# Patient Record
Sex: FEMALE | Race: WHITE | Employment: FULL TIME | URBAN - METROPOLITAN AREA
[De-identification: names, ages, dates, MRNs, and addresses within clinical notes are randomized per-mention and may not be internally consistent; named-entity substitution may affect disease eponyms.]

---

## 2022-10-29 ENCOUNTER — APPOINTMENT (OUTPATIENT)
Dept: GENERAL RADIOLOGY | Age: 62
End: 2022-10-29
Payer: COMMERCIAL

## 2022-10-29 ENCOUNTER — HOSPITAL ENCOUNTER (EMERGENCY)
Age: 62
Discharge: HOME | End: 2022-10-29
Attending: EMERGENCY MEDICINE | Admitting: EMERGENCY MEDICINE
Payer: COMMERCIAL

## 2022-10-29 VITALS
DIASTOLIC BLOOD PRESSURE: 84 MMHG | RESPIRATION RATE: 20 BRPM | TEMPERATURE: 98.4 F | BODY MASS INDEX: 28.53 KG/M2 | HEIGHT: 63 IN | OXYGEN SATURATION: 98 % | HEART RATE: 64 BPM | WEIGHT: 161 LBS | SYSTOLIC BLOOD PRESSURE: 156 MMHG

## 2022-10-29 DIAGNOSIS — J20.9 ACUTE BRONCHITIS, UNSPECIFIED ORGANISM: Primary | ICD-10-CM

## 2022-10-29 LAB
FLUAV AG NPH QL IA: NEGATIVE
FLUBV AG NPH QL IA: NEGATIVE
SARS-COV-2 RDRP RESP QL NAA+PROBE: NOT DETECTED
SOURCE: NORMAL
SPECIMEN SOURCE: NORMAL

## 2022-10-29 PROCEDURE — 99284 EMERGENCY DEPT VISIT MOD MDM: CPT

## 2022-10-29 PROCEDURE — 71045 X-RAY EXAM CHEST 1 VIEW: CPT

## 2022-10-29 PROCEDURE — 94761 N-INVAS EAR/PLS OXIMETRY MLT: CPT

## 2022-10-29 PROCEDURE — 87635 SARS-COV-2 COVID-19 AMP PRB: CPT

## 2022-10-29 PROCEDURE — 87804 INFLUENZA ASSAY W/OPTIC: CPT

## 2022-10-29 RX ORDER — LANSOPRAZOLE 30 MG/1
30 CAPSULE, DELAYED RELEASE ORAL DAILY
COMMUNITY
End: 2022-11-25

## 2022-10-29 RX ORDER — ALBUTEROL SULFATE 90 UG/1
2 AEROSOL, METERED RESPIRATORY (INHALATION) EVERY 4 HOURS PRN
Qty: 1 EACH | Refills: 0 | Status: SHIPPED | OUTPATIENT
Start: 2022-10-29

## 2022-10-29 ASSESSMENT — ENCOUNTER SYMPTOMS
RHINORRHEA: 1
SHORTNESS OF BREATH: 0
CHEST TIGHTNESS: 0
ABDOMINAL PAIN: 0
VOMITING: 0
SORE THROAT: 1
NAUSEA: 1
DIARRHEA: 1
COUGH: 1

## 2022-10-29 ASSESSMENT — PAIN SCALES - GENERAL
PAINLEVEL_OUTOF10: 3
PAINLEVEL_OUTOF10: 4

## 2022-10-29 ASSESSMENT — PAIN - FUNCTIONAL ASSESSMENT
PAIN_FUNCTIONAL_ASSESSMENT: 0-10
PAIN_FUNCTIONAL_ASSESSMENT: 0-10

## 2022-10-29 ASSESSMENT — PAIN DESCRIPTION - LOCATION: LOCATION: RIB CAGE

## 2022-10-29 NOTE — ED PROVIDER NOTES
Emergency Department Provider Note                   PCP:                None Provider               Age: 60 y.o. Sex: female       ICD-10-CM    1. Acute bronchitis, unspecified organism  J20.9           DISPOSITION Decision To Discharge 10/29/2022 04:14:36 PM        MDM  Number of Diagnoses or Management Options  Acute bronchitis, unspecified organism  Diagnosis management comments: Patient has URI with cough and clinically no pneumonia. Chest x-ray is also negative for pneumonia. Will test for flu and offer her Tamiflu if positive. We will test for COVID and offer her PACs lipids since she is unvaccinated if positive. No tachycardia or hypoxia to suggest PE and no PE risk factors. Amount and/or Complexity of Data Reviewed  Clinical lab tests: ordered and reviewed  Tests in the radiology section of CPT®: ordered and reviewed    Risk of Complications, Morbidity, and/or Mortality  Presenting problems: low  Diagnostic procedures: low  Management options: low    Patient Progress  Patient progress: stable             Orders Placed This Encounter   Procedures    COVID-19, Rapid    Rapid influenza A/B antigens    XR CHEST PORTABLE    Continuous Pulse Oximetry        Medications - No data to display    New Prescriptions    ALBUTEROL SULFATE HFA (VENTOLIN HFA) 108 (90 BASE) MCG/ACT INHALER    Inhale 2 puffs into the lungs every 4 hours as needed for Wheezing (cough)        Miguel Lawler is a 60 y.o. female who presents to the Emergency Department with chief complaint of    Chief Complaint   Patient presents with    Shortness of Breath    Cough      69-year-old female with sore throat runny nose and congestion yesterday and this morning with cough and pain in her chest when she coughs and breathes. Low-grade fever reported. Patient concerned about going to work and being contagious. She is unvaccinated for COVID and the flu. She has a history of asthma.         Review of Systems   Constitutional: Positive for fever. Negative for chills. HENT:  Positive for congestion, rhinorrhea and sore throat. Respiratory:  Positive for cough. Negative for chest tightness and shortness of breath. Cardiovascular:  Positive for chest pain. Negative for leg swelling. Gastrointestinal:  Positive for diarrhea and nausea. Negative for abdominal pain and vomiting. Musculoskeletal:  Negative for arthralgias and myalgias. All other systems reviewed and are negative. Past Medical History:   Diagnosis Date    Asthma     Hypertension         No past surgical history on file. No family history on file. Social History     Socioeconomic History    Marital status:    Tobacco Use    Smoking status: Never    Smokeless tobacco: Never   Substance and Sexual Activity    Alcohol use: Yes     Alcohol/week: 3.0 standard drinks     Types: 3 Glasses of wine per week         Penicillins     Previous Medications    LANSOPRAZOLE (PREVACID) 30 MG DELAYED RELEASE CAPSULE    Take 30 mg by mouth daily    PROPRANOLOL (INDERAL XL) 80 MG EXTENDED RELEASE CAPSULE    Take 80 mg by mouth daily        Vitals signs and nursing note reviewed. Patient Vitals for the past 4 hrs:   Temp Pulse Resp SpO2   10/29/22 1520 -- 58 -- --   10/29/22 1418 98.4 °F (36.9 °C) 53 18 99 %          Physical Exam  Vitals and nursing note reviewed. Constitutional:       General: She is not in acute distress. Appearance: Normal appearance. She is not ill-appearing. HENT:      Head: Normocephalic and atraumatic. Nose: Nose normal.      Mouth/Throat:      Mouth: Mucous membranes are moist.   Eyes:      Conjunctiva/sclera: Conjunctivae normal.      Pupils: Pupils are equal, round, and reactive to light. Cardiovascular:      Rate and Rhythm: Normal rate and regular rhythm. Pulses: Normal pulses. Heart sounds: Normal heart sounds. Pulmonary:      Effort: Pulmonary effort is normal.      Breath sounds: Normal breath sounds. Abdominal:      General: There is no distension. Palpations: Abdomen is soft. Tenderness: There is no abdominal tenderness. There is no guarding or rebound. Musculoskeletal:         General: Normal range of motion. Right lower leg: No tenderness. No edema. Left lower leg: No tenderness. No edema. Skin:     General: Skin is warm and dry. Neurological:      Mental Status: She is alert and oriented to person, place, and time. Psychiatric:         Behavior: Behavior normal.        Procedures    Results for orders placed or performed during the hospital encounter of 10/29/22   COVID-19, Rapid    Specimen: Nasopharyngeal   Result Value Ref Range    Source Nasopharyngeal      SARS-CoV-2, Rapid Not detected NOTD     Rapid influenza A/B antigens    Specimen: Nasal Washing   Result Value Ref Range    Influenza A Ag Negative NEG      Influenza B Ag Negative NEG      Source Nasopharyngeal     XR CHEST PORTABLE    Narrative    Chest portable    CLINICAL INDICATION: Acute moderate shortness of breath, productive cough    COMPARISON: None    TECHNIQUE: single AP portable view chest at 2:30 PM upright     FINDINGS:  There is no evidence of consolidation, pneumothorax, pleural effusion  or pulmonary edema. Minimal linear atelectasis in the left base. The  mediastinal and hilar contours are normal given technique. Bone density appears  low. Patient is slightly rotated to the left. Impression    No acute airspace disease. XR CHEST PORTABLE   Final Result   No acute airspace disease. Voice dictation software was used during the making of this note. This software is not perfect and grammatical and other typographical errors may be present. This note has not been completely proofread for errors.      Evelyn Reyna MD  10/29/22 1514

## 2022-10-29 NOTE — DISCHARGE INSTRUCTIONS
Tylenol and Motrin for aches pains and fevers. Albuterol 2 puffs with spacer every 4 hours as needed for cough or wheezing. NyQuil at night for cough suppression and improved sleep. Warm fluids, chicken soup and cough drops during the day for cough. Follow-up with the doctor provided in 1 to 2 weeks if not improving, cough may persist for several weeks.

## 2022-11-16 SDOH — HEALTH STABILITY: PHYSICAL HEALTH: ON AVERAGE, HOW MANY DAYS PER WEEK DO YOU ENGAGE IN MODERATE TO STRENUOUS EXERCISE (LIKE A BRISK WALK)?: 0 DAYS

## 2022-11-16 SDOH — HEALTH STABILITY: PHYSICAL HEALTH: ON AVERAGE, HOW MANY MINUTES DO YOU ENGAGE IN EXERCISE AT THIS LEVEL?: 0 MIN

## 2022-11-17 ENCOUNTER — OFFICE VISIT (OUTPATIENT)
Dept: PRIMARY CARE CLINIC | Facility: CLINIC | Age: 62
End: 2022-11-17
Payer: COMMERCIAL

## 2022-11-17 VITALS
HEIGHT: 63 IN | SYSTOLIC BLOOD PRESSURE: 191 MMHG | RESPIRATION RATE: 15 BRPM | OXYGEN SATURATION: 95 % | HEART RATE: 62 BPM | TEMPERATURE: 97.3 F | BODY MASS INDEX: 29.06 KG/M2 | DIASTOLIC BLOOD PRESSURE: 85 MMHG | WEIGHT: 164 LBS

## 2022-11-17 DIAGNOSIS — Z87.891 EX-SMOKER: ICD-10-CM

## 2022-11-17 DIAGNOSIS — I10 UNCONTROLLED HYPERTENSION: ICD-10-CM

## 2022-11-17 DIAGNOSIS — Z12.31 ENCOUNTER FOR SCREENING MAMMOGRAM FOR MALIGNANT NEOPLASM OF BREAST: ICD-10-CM

## 2022-11-17 DIAGNOSIS — E11.69 TYPE 2 DIABETES MELLITUS WITH OTHER SPECIFIED COMPLICATION, WITHOUT LONG-TERM CURRENT USE OF INSULIN (HCC): ICD-10-CM

## 2022-11-17 DIAGNOSIS — Z79.899 MEDICATION MANAGEMENT: ICD-10-CM

## 2022-11-17 DIAGNOSIS — Z91.89 MULTIPLE RISK FACTORS FOR CORONARY ARTERY DISEASE: ICD-10-CM

## 2022-11-17 DIAGNOSIS — Z84.2: ICD-10-CM

## 2022-11-17 DIAGNOSIS — R06.02 SOB (SHORTNESS OF BREATH): ICD-10-CM

## 2022-11-17 DIAGNOSIS — Z85.858 HISTORY OF PARATHYROID CANCER: ICD-10-CM

## 2022-11-17 DIAGNOSIS — Z12.11 COLON CANCER SCREENING: ICD-10-CM

## 2022-11-17 DIAGNOSIS — J44.1 COPD EXACERBATION (HCC): Primary | ICD-10-CM

## 2022-11-17 PROBLEM — R06.89 BREATHING DIFFICULT: Status: ACTIVE | Noted: 2022-10-27

## 2022-11-17 LAB
ALBUMIN SERPL-MCNC: 3.7 G/DL (ref 3.2–4.6)
ALBUMIN/GLOB SERPL: 1.1 (ref 0.4–1.6)
ALP SERPL-CCNC: 64 U/L (ref 50–136)
ALT SERPL-CCNC: 51 U/L (ref 12–65)
ANION GAP SERPL CALC-SCNC: 4 MMOL/L (ref 2–11)
AST SERPL-CCNC: 25 U/L (ref 15–37)
BASOPHILS # BLD: 0.1 K/UL (ref 0–0.2)
BASOPHILS NFR BLD: 1 % (ref 0–2)
BILIRUB SERPL-MCNC: 0.2 MG/DL (ref 0.2–1.1)
BILIRUBIN, URINE, POC: NEGATIVE
BLOOD URINE, POC: NEGATIVE
BUN SERPL-MCNC: 8 MG/DL (ref 8–23)
CALCIUM SERPL-MCNC: 7.7 MG/DL (ref 8.3–10.4)
CHLORIDE SERPL-SCNC: 105 MMOL/L (ref 101–110)
CHOLEST SERPL-MCNC: 198 MG/DL
CO2 SERPL-SCNC: 32 MMOL/L (ref 21–32)
CREAT SERPL-MCNC: 0.7 MG/DL (ref 0.6–1)
DIFFERENTIAL METHOD BLD: ABNORMAL
EOSINOPHIL # BLD: 0.8 K/UL (ref 0–0.8)
EOSINOPHIL NFR BLD: 8 % (ref 0.5–7.8)
ERYTHROCYTE [DISTWIDTH] IN BLOOD BY AUTOMATED COUNT: 12.8 % (ref 11.9–14.6)
GLOBULIN SER CALC-MCNC: 3.3 G/DL (ref 2.8–4.5)
GLUCOSE SERPL-MCNC: 95 MG/DL (ref 65–100)
GLUCOSE URINE, POC: NEGATIVE
HCT VFR BLD AUTO: 44.9 % (ref 35.8–46.3)
HDLC SERPL-MCNC: 58 MG/DL (ref 40–60)
HDLC SERPL: 3.4
HGB BLD-MCNC: 15 G/DL (ref 11.7–15.4)
IMM GRANULOCYTES # BLD AUTO: 0.1 K/UL (ref 0–0.5)
IMM GRANULOCYTES NFR BLD AUTO: 1 % (ref 0–5)
KETONES, URINE, POC: NEGATIVE
LDLC SERPL CALC-MCNC: 98.2 MG/DL
LEUKOCYTE ESTERASE, URINE, POC: NORMAL
LYMPHOCYTES # BLD: 3.3 K/UL (ref 0.5–4.6)
LYMPHOCYTES NFR BLD: 30 % (ref 13–44)
MCH RBC QN AUTO: 31.1 PG (ref 26.1–32.9)
MCHC RBC AUTO-ENTMCNC: 33.4 G/DL (ref 31.4–35)
MCV RBC AUTO: 93.2 FL (ref 82–102)
MONOCYTES # BLD: 1.2 K/UL (ref 0.1–1.3)
MONOCYTES NFR BLD: 11 % (ref 4–12)
NEUTS SEG # BLD: 5.6 K/UL (ref 1.7–8.2)
NEUTS SEG NFR BLD: 49 % (ref 43–78)
NITRITE, URINE, POC: NEGATIVE
NRBC # BLD: 0 K/UL (ref 0–0.2)
PH, URINE, POC: 7 (ref 4.6–8)
PLATELET # BLD AUTO: 285 K/UL (ref 150–450)
PMV BLD AUTO: 9.7 FL (ref 9.4–12.3)
POTASSIUM SERPL-SCNC: 4.5 MMOL/L (ref 3.5–5.1)
PROT SERPL-MCNC: 7 G/DL (ref 6.3–8.2)
PROTEIN,URINE, POC: NEGATIVE
RBC # BLD AUTO: 4.82 M/UL (ref 4.05–5.2)
SODIUM SERPL-SCNC: 141 MMOL/L (ref 133–143)
SPECIFIC GRAVITY, URINE, POC: 1.01 (ref 1–1.03)
TRIGL SERPL-MCNC: 209 MG/DL (ref 35–150)
TSH, 3RD GENERATION: 1.19 UIU/ML (ref 0.36–3.74)
URINALYSIS CLARITY, POC: CLEAR
URINALYSIS COLOR, POC: YELLOW
UROBILINOGEN, POC: NORMAL
VLDLC SERPL CALC-MCNC: 41.8 MG/DL (ref 6–23)
WBC # BLD AUTO: 11.1 K/UL (ref 4.3–11.1)

## 2022-11-17 PROCEDURE — 81003 URINALYSIS AUTO W/O SCOPE: CPT | Performed by: FAMILY MEDICINE

## 2022-11-17 PROCEDURE — 99204 OFFICE O/P NEW MOD 45 MIN: CPT | Performed by: FAMILY MEDICINE

## 2022-11-17 PROCEDURE — 94010 BREATHING CAPACITY TEST: CPT | Performed by: FAMILY MEDICINE

## 2022-11-17 PROCEDURE — 93000 ELECTROCARDIOGRAM COMPLETE: CPT | Performed by: FAMILY MEDICINE

## 2022-11-17 PROCEDURE — 3079F DIAST BP 80-89 MM HG: CPT | Performed by: FAMILY MEDICINE

## 2022-11-17 PROCEDURE — 3077F SYST BP >= 140 MM HG: CPT | Performed by: FAMILY MEDICINE

## 2022-11-17 RX ORDER — LOSARTAN POTASSIUM 50 MG/1
50 TABLET ORAL DAILY
Qty: 90 TABLET | Refills: 5 | Status: SHIPPED | OUTPATIENT
Start: 2022-11-17

## 2022-11-17 RX ORDER — MULTIVIT-MIN/IRON FUM/FOLIC AC 7.5 MG-4
TABLET ORAL
Qty: 100 TABLET | Refills: 3 | Status: SHIPPED | OUTPATIENT
Start: 2022-11-17 | End: 2022-11-25

## 2022-11-17 RX ORDER — FLUTICASONE PROPIONATE AND SALMETEROL 100; 50 UG/1; UG/1
1 POWDER RESPIRATORY (INHALATION)
COMMUNITY
End: 2022-11-17 | Stop reason: ALTCHOICE

## 2022-11-17 RX ORDER — METOPROLOL SUCCINATE 50 MG/1
50 TABLET, EXTENDED RELEASE ORAL DAILY
Qty: 90 TABLET | Refills: 5 | Status: SHIPPED | OUTPATIENT
Start: 2022-11-17

## 2022-11-17 RX ORDER — FLUTICASONE PROPIONATE AND SALMETEROL 500; 50 UG/1; UG/1
1 POWDER RESPIRATORY (INHALATION) EVERY 12 HOURS
Qty: 60 EACH | Refills: 3 | Status: SHIPPED | OUTPATIENT
Start: 2022-11-17

## 2022-11-17 RX ORDER — AZITHROMYCIN 250 MG/1
250 TABLET, FILM COATED ORAL SEE ADMIN INSTRUCTIONS
Qty: 6 TABLET | Refills: 0 | Status: SHIPPED | OUTPATIENT
Start: 2022-11-17 | End: 2022-11-22

## 2022-11-17 RX ORDER — CLOTRIMAZOLE AND BETAMETHASONE DIPROPIONATE 10; .64 MG/G; MG/G
CREAM TOPICAL DAILY
COMMUNITY

## 2022-11-17 RX ORDER — ALBUTEROL SULFATE 2.5 MG/3ML
SOLUTION RESPIRATORY (INHALATION)
COMMUNITY
Start: 2022-11-09 | End: 2022-11-17 | Stop reason: ALTCHOICE

## 2022-11-17 RX ORDER — FLUTICASONE PROPIONATE 50 MCG
SPRAY, SUSPENSION (ML) NASAL
COMMUNITY

## 2022-11-17 RX ORDER — EPINEPHRINE 0.3 MG/.3ML
INJECTION SUBCUTANEOUS
COMMUNITY
End: 2022-11-17 | Stop reason: ALTCHOICE

## 2022-11-17 ASSESSMENT — ENCOUNTER SYMPTOMS
EYE REDNESS: 0
BLOOD IN STOOL: 0
ABDOMINAL PAIN: 0
CHOKING: 0
BACK PAIN: 0
COUGH: 1
RHINORRHEA: 0
TROUBLE SWALLOWING: 0
SINUS PRESSURE: 0
SHORTNESS OF BREATH: 1
COLOR CHANGE: 0
VOICE CHANGE: 0
ABDOMINAL DISTENTION: 0
VOMITING: 0
SINUS PAIN: 0
CHEST TIGHTNESS: 0
PHOTOPHOBIA: 0
CONSTIPATION: 0
NAUSEA: 0
EYE PAIN: 0
SORE THROAT: 0
WHEEZING: 1
DIARRHEA: 0
EYE DISCHARGE: 0

## 2022-11-17 ASSESSMENT — PATIENT HEALTH QUESTIONNAIRE - PHQ9
SUM OF ALL RESPONSES TO PHQ9 QUESTIONS 1 & 2: 0
2. FEELING DOWN, DEPRESSED OR HOPELESS: 0
1. LITTLE INTEREST OR PLEASURE IN DOING THINGS: 0
SUM OF ALL RESPONSES TO PHQ QUESTIONS 1-9: 0

## 2022-11-17 NOTE — PROGRESS NOTES
For follow-up of her numerous medical problems. She moved from [place]. Previously lost her insurance due for preventative care exam colon cancer screening mammogram.  Recent hospital visit for shortness of breath cough or phlegm. Flu test COVID test negative. She has more than 30-year history of 1 to 2 pack smoking. It seems has COPD exacerbation she is still wheezing and has cough slight phlegm chest x-ray negative. We will check pulmonary function test EKG. Uncontrolled hypertension blood pressure in the office 191 and/85. She has been on propanolol for a long time for hypertension. We will change to metoprolol due to asthma COPD and add losartan. Questionable history diabetes check lab test.  Discussed preventative care exam risk factor management for coronary artery disease. She had surgery for carpal tunnel syndrome some sort of nerve transplant. She also had parathyroid resection for parathyroid cancer many years ago and has been disease-free. History acid reflux. She had a hysterectomy and bilateral salpingo-oophorectomy    Review of Systems   Constitutional:  Negative for activity change, appetite change, chills, diaphoresis, fatigue, fever and unexpected weight change. HENT:  Negative for congestion, ear pain, hearing loss, nosebleeds, rhinorrhea, sinus pressure, sinus pain, sore throat, trouble swallowing and voice change. Eyes:  Negative for photophobia, pain, discharge, redness and visual disturbance. Respiratory:  Positive for cough, shortness of breath and wheezing. Negative for choking and chest tightness. Cardiovascular:  Negative for chest pain, palpitations and leg swelling. Hypertension uncontrolled   Gastrointestinal:  Negative for abdominal distention, abdominal pain, blood in stool, constipation, diarrhea, nausea and vomiting. Endocrine: Negative for cold intolerance, heat intolerance, polydipsia, polyphagia and polyuria.         History of diabetes this is questionable she also thinks that she had episodes of hypoglycemia not taking any medication. Last blood sugar was 102   Genitourinary:  Negative for difficulty urinating, dysuria, enuresis, frequency, genital sores, hematuria, menstrual problem, urgency and vaginal discharge. History hysterectomy and bilateral salpingo-oophorectomy   Musculoskeletal:  Negative for arthralgias, back pain, gait problem, joint swelling, myalgias and neck pain. History carpal tunnel syndrome hand surgery   Skin:  Negative for color change and rash. Allergic/Immunologic: Negative for environmental allergies and food allergies. Neurological:  Negative for dizziness, tremors, seizures, syncope, speech difficulty, weakness, numbness and headaches. Hematological:  Negative for adenopathy. Does not bruise/bleed easily. Psychiatric/Behavioral:  Negative for behavioral problems, confusion, decreased concentration, hallucinations, self-injury, sleep disturbance and suicidal ideas. The patient is not nervous/anxious. Physical Exam  Vitals and nursing note reviewed. Exam conducted with a chaperone present. Constitutional:       General: She is not in acute distress. Appearance: Normal appearance. She is obese. She is not ill-appearing or toxic-appearing. HENT:      Head: Normocephalic and atraumatic. Right Ear: External ear normal.      Left Ear: External ear normal.      Nose: Nose normal. No congestion or rhinorrhea. Mouth/Throat:      Mouth: Mucous membranes are moist.      Pharynx: No oropharyngeal exudate. Eyes:      General: No scleral icterus. Right eye: No discharge. Left eye: No discharge. Extraocular Movements: Extraocular movements intact. Conjunctiva/sclera: Conjunctivae normal.      Pupils: Pupils are equal, round, and reactive to light. Cardiovascular:      Rate and Rhythm: Normal rate and regular rhythm. Pulses: Normal pulses.       Heart sounds: Normal heart sounds. No murmur heard. No gallop. Comments: Markedly elevated blood pressure  Pulmonary:      Effort: Pulmonary effort is normal. No respiratory distress. Breath sounds: No stridor. Wheezing and rhonchi present. No rales. Comments: Mild wheezing occasional rhonchi  Chest:      Chest wall: No tenderness. Abdominal:      General: Abdomen is flat. There is no distension. Palpations: Abdomen is soft. There is no mass. Tenderness: There is no abdominal tenderness. There is no right CVA tenderness, guarding or rebound. Hernia: No hernia is present. Genitourinary:     Vagina: No vaginal discharge. Musculoskeletal:         General: No swelling, tenderness, deformity or signs of injury. Normal range of motion. Cervical back: Normal range of motion and neck supple. No rigidity. Right lower leg: No edema. Left lower leg: No edema. Lymphadenopathy:      Cervical: No cervical adenopathy. Skin:     General: Skin is warm. Capillary Refill: Capillary refill takes less than 2 seconds. Coloration: Skin is not jaundiced or pale. Findings: No bruising, erythema, lesion or rash. Neurological:      General: No focal deficit present. Mental Status: She is alert and oriented to person, place, and time. Mental status is at baseline. Cranial Nerves: No cranial nerve deficit. Motor: No weakness. Coordination: Coordination normal.      Gait: Gait normal.   Psychiatric:         Mood and Affect: Mood normal.         Behavior: Behavior normal.        1. COPD exacerbation (HCC)  Here for follow-up after hospital visit for shortness of breath cough with productive phlegm symptoms are not resolved. She has more than 30 years history of back smoking started at the age of 60. Seems she has COPD. We will check baseline pulmonary function test EKG. Medication discussed start Combivent Advair antibiotics.   Recent chest x-ray negative Hospital records reviewed  - azithromycin (ZITHROMAX) 250 MG tablet; Take 1 tablet by mouth See Admin Instructions for 5 days 500mg on day 1 followed by 250mg on days 2 - 5  Dispense: 6 tablet; Refill: 0  - 08245 - NM BREATHING CAPACITY TEST    2. Uncontrolled hypertension  Uncontrolled hypertension multiple risk factors for coronary artery disease. Change propanolol to metoprolol and losartan consider adding statins recheck after lab test  - Comprehensive Metabolic Panel; Future  - Lipid Panel; Future  - AMB POC URINALYSIS DIP STICK AUTO W/O MICRO  - 20302 - NM ELECTROCARDIOGRAM, COMPLETE  - metoprolol succinate (TOPROL XL) 50 MG extended release tablet; Take 1 tablet by mouth daily  Dispense: 90 tablet; Refill: 5  - losartan (COZAAR) 50 MG tablet; Take 1 tablet by mouth daily  Dispense: 90 tablet; Refill: 5    3. SOB (shortness of breath)  Shortness of breath related to possible COPD  - CBC with Auto Differential; Future  - 99754 - NM ELECTROCARDIOGRAM, COMPLETE  - 12579 - NM BREATHING CAPACITY TEST    4. Medication management    - CBC with Auto Differential; Future  - Comprehensive Metabolic Panel; Future  - TSH; Future    5. Type 2 diabetes mellitus with other specified complication, without long-term current use of insulin (HCC)  Questionable history diabetes BMI 29 recheck after lab test  - Hemoglobin A1C; Future    6. Colon cancer screening  Preventative care discussed including colon cancer screening mammogram.  She had a hysterectomy does not need Pap smear. Will benefit from flu shot COVID vaccination Pneumovax. Has got insurance recently  - AMB POC BLOOD OCCULT QUAL FECAL HEMGLBN 1-3    7. Encounter for screening mammogram for malignant neoplasm of breast    - SARI DIGITAL SCREEN W OR WO CAD BILATERAL; Future    8.  Ion Lowery MD

## 2022-11-18 LAB
EST. AVERAGE GLUCOSE BLD GHB EST-MCNC: 126 MG/DL
HBA1C MFR BLD: 6 % (ref 4.8–5.6)

## 2022-11-21 DIAGNOSIS — J44.1 COPD EXACERBATION (HCC): Primary | ICD-10-CM

## 2022-11-25 ENCOUNTER — OFFICE VISIT (OUTPATIENT)
Dept: PULMONOLOGY | Age: 62
End: 2022-11-25
Payer: COMMERCIAL

## 2022-11-25 VITALS
BODY MASS INDEX: 29.06 KG/M2 | HEART RATE: 71 BPM | HEIGHT: 63 IN | TEMPERATURE: 98.1 F | OXYGEN SATURATION: 98 % | SYSTOLIC BLOOD PRESSURE: 174 MMHG | DIASTOLIC BLOOD PRESSURE: 77 MMHG | WEIGHT: 164 LBS

## 2022-11-25 DIAGNOSIS — K21.9 GASTROESOPHAGEAL REFLUX DISEASE, UNSPECIFIED WHETHER ESOPHAGITIS PRESENT: ICD-10-CM

## 2022-11-25 DIAGNOSIS — J45.51 SEVERE PERSISTENT ASTHMA WITH ACUTE EXACERBATION: Primary | ICD-10-CM

## 2022-11-25 DIAGNOSIS — R06.2 WHEEZING: ICD-10-CM

## 2022-11-25 DIAGNOSIS — R05.3 CHRONIC COUGH: ICD-10-CM

## 2022-11-25 DIAGNOSIS — R06.02 SHORTNESS OF BREATH: ICD-10-CM

## 2022-11-25 DIAGNOSIS — R09.82 POSTNASAL DRIP: ICD-10-CM

## 2022-11-25 LAB
EXPIRATORY TIME: NORMAL
FEF 25-75% %PRED-PRE: NORMAL
FEF 25-75% PRED: NORMAL
FEF 25-75%-PRE: NORMAL
FEV1 %PRED-PRE: 80 %
FEV1 PRED: NORMAL
FEV1/FVC %PRED-PRE: NORMAL
FEV1/FVC PRED: NORMAL
FEV1/FVC: 71 %
FEV1: 1.91 L
FVC %PRED-PRE: 87 %
FVC PRED: NORMAL
FVC: 2.71 L
PEF %PRED-PRE: NORMAL
PEF PRED: NORMAL
PEF-PRE: NORMAL

## 2022-11-25 PROCEDURE — 3077F SYST BP >= 140 MM HG: CPT | Performed by: INTERNAL MEDICINE

## 2022-11-25 PROCEDURE — 94010 BREATHING CAPACITY TEST: CPT | Performed by: INTERNAL MEDICINE

## 2022-11-25 PROCEDURE — 99205 OFFICE O/P NEW HI 60 MIN: CPT | Performed by: INTERNAL MEDICINE

## 2022-11-25 PROCEDURE — 3078F DIAST BP <80 MM HG: CPT | Performed by: INTERNAL MEDICINE

## 2022-11-25 RX ORDER — MONTELUKAST SODIUM 10 MG/1
10 TABLET ORAL DAILY
Qty: 90 TABLET | Refills: 4 | Status: SHIPPED | OUTPATIENT
Start: 2022-11-25 | End: 2023-02-23

## 2022-11-25 RX ORDER — LANSOPRAZOLE 15 MG/1
30 CAPSULE, DELAYED RELEASE ORAL NIGHTLY
Qty: 90 CAPSULE | Refills: 4 | Status: SHIPPED | OUTPATIENT
Start: 2022-11-25

## 2022-11-25 RX ORDER — LANSOPRAZOLE 15 MG/1
15 CAPSULE, DELAYED RELEASE ORAL NIGHTLY
Qty: 90 CAPSULE | Refills: 4 | Status: SHIPPED | OUTPATIENT
Start: 2022-11-25 | End: 2022-11-25

## 2022-11-25 RX ORDER — ALBUTEROL SULFATE 90 UG/1
2 AEROSOL, METERED RESPIRATORY (INHALATION) EVERY 6 HOURS PRN
Qty: 3 EACH | Refills: 4 | Status: SHIPPED | OUTPATIENT
Start: 2022-11-25

## 2022-11-25 RX ORDER — EPINEPHRINE 0.1 MG/ML
1 INJECTION, SOLUTION INTRAVENOUS ONCE
COMMUNITY

## 2022-11-25 RX ORDER — FLUTICASONE PROPIONATE AND SALMETEROL 500; 50 UG/1; UG/1
1 POWDER RESPIRATORY (INHALATION) EVERY 12 HOURS
Qty: 3 EACH | Refills: 4 | Status: SHIPPED | OUTPATIENT
Start: 2022-11-25 | End: 2023-02-23

## 2022-11-25 ASSESSMENT — PULMONARY FUNCTION TESTS
FVC_PERCENT_PREDICTED_PRE: 87
FEV1_PERCENT_PREDICTED_PRE: 80
FEV1: 1.91
FVC: 2.71
FEV1/FVC: 71

## 2022-11-25 NOTE — PROGRESS NOTES
[address] [facility]. [address], [address] [facility]  [phone]    Patient Name:  Kye Lawrence  YOB: 1960      Office Visit 11/25/2022    CHIEF COMPLAINT:    Chief Complaint   Patient presents with    Asthma       HISTORY OF PRESENT ILLNESS:    Is a very pleasant 69-year-old white female with history of severe childhood asthma that grew out of it as an adult presents for evaluation of recurrent episodes of shortness of breath, wheezing, coughing. The last 1 being 3 weeks ago where she presented to urgent care was given systemic steroids Advair as well as albuterol, it was recommended that she go to the hospital but she refused and actually improved with treatment and is now on her last doses of prednisone. She denies ever being admitted to the hospital or ICU for asthma exacerbation, she has environmental allergies and has been seeing allergist in the past, ragweed, hay, grass amongst others are allergens for her. She has postnasal drip as well as reflux disease, she takes lansoprazole for her reflux disease but nothing for her postnasal drip. She denies exposure to tuberculosis or asbestos, recently acquired a pet cat for the past 8 weeks with no problems with worsening symptoms according to her. 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 She is currently on Advair discus 100/50, intermittent albuterol therapy and at times nebulized albuterol therapy. She tells me that she uses albuterol at least 2-3 times a day constantly. She also has nocturnal symptoms and wakes up at night with shortness of breath. He is a former smoker having quit in 2001 and accumulated about a 40-pack-year history.       Past Medical History:   Diagnosis Date Asthma     Hypertension          Patient Active Problem List   Diagnosis    Uncontrolled hypertension    SOB (shortness of breath)    Diabetes mellitus (HCC)    FH: total abdominal hysterectomy and bilateral salpingo-oophorectomy    Ex-smoker    COPD exacerbation (HCC)    Medication management    History of parathyroid cancer           Past Surgical History:   Procedure Laterality Date    CARPAL TUNNEL RELEASE      PARATHYROID GLAND SURGERY           Social History     Socioeconomic History    Marital status:      Spouse name: Not on file    Number of children: Not on file    Years of education: Not on file    Highest education level: Not on file   Occupational History    Not on file   Tobacco Use    Smoking status: Former     Packs/day: 2.00     Years: 38.00     Pack years: 76.00     Types: Cigarettes     Quit date:      Years since quittin.9    Smokeless tobacco: Never   Substance and Sexual Activity    Alcohol use: Not Currently     Alcohol/week: 3.0 standard drinks     Types: 3 Glasses of wine per week    Drug use: Not on file    Sexual activity: Not on file   Other Topics Concern    Not on file   Social History Narrative    Not on file     Social Determinants of Health     Financial Resource Strain: Not on file   Food Insecurity: Not on file   Transportation Needs: Not on file   Physical Activity: Inactive    Days of Exercise per Week: 0 days    Minutes of Exercise per Session: 0 min   Stress: Not on file   Social Connections: Not on file   Intimate Partner Violence: Not At Risk    Fear of Current or Ex-Partner: No    Emotionally Abused: No    Physically Abused: No    Sexually Abused: No   Housing Stability: Not on file         Family History   Problem Relation Age of Onset    Hypertension Mother     Diabetes Father          Allergies   Allergen Reactions    Bee Venom Anaphylaxis    Penicillins Hives         Current Outpatient Medications   Medication Sig    EPINEPHrine 1 MG/10ML SOSY IV injection (ACLS) Infuse 1 mg intravenously once EPINEPHRINE 0.3MG/0.3ML INJ MATIAS    fluticasone-salmeterol (ADVAIR DISKUS) 500-50 MCG/ACT AEPB diskus inhaler Inhale 1 puff into the lungs in the morning and 1 puff in the evening. albuterol sulfate HFA (PROAIR HFA) 108 (90 Base) MCG/ACT inhaler Inhale 2 puffs into the lungs every 6 hours as needed for Wheezing    montelukast (SINGULAIR) 10 MG tablet Take 1 tablet by mouth daily    lansoprazole (PREVACID) 15 MG delayed release capsule Take 2 capsules by mouth nightly    fluticasone (FLONASE) 50 MCG/ACT nasal spray by Nasal route    clotrimazole-betamethasone (LOTRISONE) 1-0.05 % cream Apply topically daily    albuterol-ipratropium (COMBIVENT RESPIMAT)  MCG/ACT AERS inhaler Inhale 1 puff into the lungs in the morning and 1 puff at noon and 1 puff in the evening and 1 puff before bedtime. fluticasone-salmeterol (ADVAIR) 500-50 MCG/ACT AEPB diskus inhaler Inhale 1 puff into the lungs in the morning and 1 puff in the evening. metoprolol succinate (TOPROL XL) 50 MG extended release tablet Take 1 tablet by mouth daily    losartan (COZAAR) 50 MG tablet Take 1 tablet by mouth daily    albuterol sulfate HFA (VENTOLIN HFA) 108 (90 Base) MCG/ACT inhaler Inhale 2 puffs into the lungs every 4 hours as needed for Wheezing (cough)    Multiple Vitamins-Minerals (MULTIVITAMIN WITH MINERALS) tablet Once daily OTC     No current facility-administered medications for this visit. Review of Systems          PHYSICAL EXAM:    Vitals:    11/25/22 0943   BP: (!) 174/77   Pulse: 71   Temp: 98.1 °F (36.7 °C)   SpO2: 98%        GENERAL APPEARANCE:   The patient is normal weight and in no respiratory distress. HEENT:   PERRL. Conjunctivae unremarkable. Nasal mucosa is without epistaxis, exudate, or polyps. Gums and dentition are unremarkable. There is no oropharyngeal narrowing. TMs are clear.    NECK/LYMPHATIC:   Symmetrical with no elevation of jugular venous pulsation. Trachea midline. No thyroid enlargement. No cervical adenopathy. LUNGS:   Normal respiratory effort with symmetrical lung expansion. Breath sounds bilateral end expiratory wheezing. HEART:   There is a regular rate and rhythm. No murmur, rub, or gallop. There is no edema in the lower extremities. ABDOMEN:   Soft and non-tender. No hepatosplenomegaly. Bowel sounds are normal.     SKIN:   There are no rashes, cyanosis, jaundice, or ecchymosis present. EXTREMITIES:   The extremities are unremarkable without clubbing, cyanosis, joint inflammation, degenerative, or ischemic change. MUSCULOSKELETAL:   There is no abnormal tone, muscle atrophy, or abnormal movement present. NEURO:   The patient is alert and oriented to person, place, and time. Memory appears intact and mood is normal.  No gross sensorimotor deficits are present. DIAGNOSTIC TESTS:      Spirometry:    11/25/2022               ASSESSMENT:  (Medical Decision Making)       Patient Active Problem List   Diagnosis    Uncontrolled hypertension    SOB (shortness of breath)    Diabetes mellitus (HCC)    FH: total abdominal hysterectomy and bilateral salpingo-oophorectomy    Ex-smoker    COPD exacerbation ([facility] [address]    Medication management    History of parathyroid cancer           PLAN:  Encounter Diagnoses   Name Primary? Severe persistent asthma with acute exacerbation Yes    Shortness of breath     Chronic cough     Wheezing     Postnasal drip     Gastroesophageal reflux disease, unspecified whether esophagitis present    The patient has clinical signs and symptoms and history consistent with uncontrolled severe persistent asthma. Her trigger most recently was likely the upper respiratory tract infection being it from RSV or not is irrelevant.   I went over the pathophysiology of asthma the treatment of asthma the principles of treatment of asthma the signs and symptoms of asthma and the signs and symptoms of exacerbation of Encounter   Procedures    Spirometry Without Bronchodilator       Orders Placed This Encounter   Medications    fluticasone-salmeterol (ADVAIR DISKUS) 500-50 MCG/ACT AEPB diskus inhaler     Sig: Inhale 1 puff into the lungs in the morning and 1 puff in the evening. Dispense:  3 each     Refill:  4    albuterol sulfate HFA (PROAIR HFA) 108 (90 Base) MCG/ACT inhaler     Sig: Inhale 2 puffs into the lungs every 6 hours as needed for Wheezing     Dispense:  3 each     Refill:  4    montelukast (SINGULAIR) 10 MG tablet     Sig: Take 1 tablet by mouth daily     Dispense:  90 tablet     Refill:  4    DISCONTD: lansoprazole (PREVACID) 15 MG delayed release capsule     Sig: Take 1 capsule by mouth nightly     Dispense:  90 capsule     Refill:  4    lansoprazole (PREVACID) 15 MG delayed release capsule     Sig: Take 2 capsules by mouth nightly     Dispense:  90 capsule     Refill:  Carlos Samuel MD    Dictated using voice recognition software.  Proofread, but unrecognized voice recognition errors may exist.

## 2022-12-07 NOTE — PROGRESS NOTES
[facility] CARDIOLOGY History & Physical                 Reason for Visit: ANDREWS    Subjective:     Patient is a 60 y.o. female with a PMH of hypertension and asthma who presents as a referral for ANDRESW. The patient follows with pulmonology for \"severe persistent asthma\". The patient denies angina. She does report ANDREWS walking up stairs. She denies lower extremity edema and orthopnea. She reports intolerances to metoprolol and losartan. Past Medical History:   Diagnosis Date    Asthma     Hypertension       Past Surgical History:   Procedure Laterality Date    CARPAL TUNNEL RELEASE      PARATHYROID GLAND SURGERY        Family History   Problem Relation Age of Onset    Hypertension Mother     Diabetes Father       Social History     Tobacco Use    Smoking status: Former     Packs/day: 2.00     Years: 38.00     Pack years: 76.00     Types: Cigarettes     Quit date:      Years since quittin.9    Smokeless tobacco: Never   Substance Use Topics    Alcohol use: Not Currently     Alcohol/week: 3.0 standard drinks     Types: 3 Glasses of wine per week      Allergies   Allergen Reactions    Bee Venom Anaphylaxis    Penicillins Hives         ROS:  No obvious pertinent positives on review of systems except for what was outlined above.        Objective:       /86   Pulse 62   Ht 5' 3\" (1.6 m)   Wt 164 lb 12.8 oz (74.8 kg)   BMI 29.19 kg/m²     BP Readings from Last 3 Encounters:   22 138/86   22 (!) 174/77   22 (!) 191/85       Wt Readings from Last 3 Encounters:   22 164 lb 12.8 oz (74.8 kg)   22 164 lb (74.4 kg)   22 164 lb (74.4 kg)       General/Constitutional:   Alert and oriented x 3, no acute distress  HEENT:   normocephalic, atraumatic, moist mucous membranes  Neck:   No JVD or carotid bruits bilaterally  Cardiovascular:   regular rate and rhythm, no rub/gallop appreciated  Pulmonary:   clear to auscultation bilaterally, no respiratory distress  Abdomen:   soft, non-tender, non-distended  Ext:   No sig LE edema bilaterally  Skin:  warm and dry, no obvious rashes seen  Neuro:   no obvious sensory or motor deficits  Psychiatric:   normal mood and affect    ECG:   Sinus rhythm  LGL pattern  Heart rate 62 bpm    Data Review:   Lab Results   Component Value Date    CHOL 198 11/17/2022     Lab Results   Component Value Date    TRIG 209 (H) 11/17/2022     Lab Results   Component Value Date    HDL 58 11/17/2022     Lab Results   Component Value Date    LDLCALC 98.2 11/17/2022     Lab Results   Component Value Date    LABVLDL 41.8 (H) 11/17/2022     Lab Results   Component Value Date    CHOLHDLRATIO 3.4 11/17/2022        Lab Results   Component Value Date/Time     11/17/2022 11:32 AM    K 4.5 11/17/2022 11:32 AM     11/17/2022 11:32 AM    CO2 32 11/17/2022 11:32 AM    BUN 8 11/17/2022 11:32 AM    CREATININE 0.70 11/17/2022 11:32 AM    GLUCOSE 95 11/17/2022 11:32 AM    CALCIUM 7.7 11/17/2022 11:32 AM         Lab Results   Component Value Date    ALT 51 11/17/2022    AST 25 11/17/2022        Assessment/Plan:   1. Hypertension, unspecified type  - PCP note reviewed from April 2022  - Well-controlled  - Currently on Toprol-XL and losartan  - Defer management to PCP    2.  ANDREWS (dyspnea on exertion)  - Pertinent positives include \"severe persistent asthma\"  - Obtain an VELASQUEZ with a full echocardiogram    F/U: As needed    Don Solorio MD

## 2022-12-08 ENCOUNTER — INITIAL CONSULT (OUTPATIENT)
Dept: CARDIOLOGY CLINIC | Age: 62
End: 2022-12-08
Payer: COMMERCIAL

## 2022-12-08 VITALS
BODY MASS INDEX: 29.2 KG/M2 | HEIGHT: 63 IN | DIASTOLIC BLOOD PRESSURE: 86 MMHG | WEIGHT: 164.8 LBS | HEART RATE: 62 BPM | SYSTOLIC BLOOD PRESSURE: 138 MMHG

## 2022-12-08 DIAGNOSIS — R06.09 DOE (DYSPNEA ON EXERTION): ICD-10-CM

## 2022-12-08 DIAGNOSIS — I10 HYPERTENSION, UNSPECIFIED TYPE: Primary | ICD-10-CM

## 2022-12-08 PROCEDURE — 3075F SYST BP GE 130 - 139MM HG: CPT | Performed by: INTERNAL MEDICINE

## 2022-12-08 PROCEDURE — 99203 OFFICE O/P NEW LOW 30 MIN: CPT | Performed by: INTERNAL MEDICINE

## 2022-12-08 PROCEDURE — 93000 ELECTROCARDIOGRAM COMPLETE: CPT | Performed by: INTERNAL MEDICINE

## 2022-12-08 PROCEDURE — 3078F DIAST BP <80 MM HG: CPT | Performed by: INTERNAL MEDICINE

## 2023-07-14 ENCOUNTER — COMMUNITY OUTREACH (OUTPATIENT)
Dept: PRIMARY CARE CLINIC | Facility: CLINIC | Age: 63
End: 2023-07-14

## 2023-10-01 NOTE — PROGRESS NOTES
distress  HEENT:   normocephalic, atraumatic, moist mucous membranes  Neck:   No JVD or carotid bruits bilaterally  Cardiovascular:   regular rate and rhythm, no rub/gallop appreciated; 2 out of 6 systolic murmur heard loudest over the right upper sternal border  Pulmonary:   clear to auscultation bilaterally, no respiratory distress  Abdomen:   soft, non-tender, non-distended  Ext:   No sig LE edema bilaterally  Skin:  warm and dry, no obvious rashes seen  Neuro:   no obvious sensory or motor deficits  Psychiatric:   normal mood and affect    ECG:  Sinus rhythm  PRWP  Heart rate 60 bpm      Data Review:   Lab Results   Component Value Date    CHOL 198 11/17/2022     Lab Results   Component Value Date    TRIG 209 (H) 11/17/2022     Lab Results   Component Value Date    HDL 58 11/17/2022     Lab Results   Component Value Date    LDLCALC 98.2 11/17/2022     Lab Results   Component Value Date    LABVLDL 41.8 (H) 11/17/2022     Lab Results   Component Value Date    CHOLHDLRATIO 3.4 11/17/2022        Lab Results   Component Value Date/Time     11/17/2022 11:32 AM    K 4.5 11/17/2022 11:32 AM     11/17/2022 11:32 AM    CO2 32 11/17/2022 11:32 AM    BUN 8 11/17/2022 11:32 AM    CREATININE 0.70 11/17/2022 11:32 AM    GLUCOSE 95 11/17/2022 11:32 AM    CALCIUM 7.7 11/17/2022 11:32 AM         Lab Results   Component Value Date    ALT 51 11/17/2022    AST 25 11/17/2022        Assessment/Plan:   1. Chest pain, unspecified type  - Obtain an VELASQUEZ   - PE unlikely per PE Wells score     2. ANDREWS (dyspnea on exertion)  - Obtain an VELASQUEZ with a full echocardiogram    3. Near syncope  - Obtain a ZIO for 3 days     4. Cardiac murmur  - Obtain a full echocardiogram as part of the stress test    5.  Hypertension, unspecified type  - PCP note reviewed  - Currently on losartan    F/U: As needed    Flavio Chavez MD

## 2023-10-03 ENCOUNTER — INITIAL CONSULT (OUTPATIENT)
Age: 63
End: 2023-10-03
Payer: COMMERCIAL

## 2023-10-03 VITALS
HEART RATE: 64 BPM | WEIGHT: 168.2 LBS | BODY MASS INDEX: 29.8 KG/M2 | SYSTOLIC BLOOD PRESSURE: 142 MMHG | DIASTOLIC BLOOD PRESSURE: 82 MMHG | HEIGHT: 63 IN

## 2023-10-03 DIAGNOSIS — R01.1 CARDIAC MURMUR: ICD-10-CM

## 2023-10-03 DIAGNOSIS — R55 NEAR SYNCOPE: ICD-10-CM

## 2023-10-03 DIAGNOSIS — R07.9 CHEST PAIN, UNSPECIFIED TYPE: Primary | ICD-10-CM

## 2023-10-03 DIAGNOSIS — I10 HYPERTENSION, UNSPECIFIED TYPE: ICD-10-CM

## 2023-10-03 DIAGNOSIS — R06.09 DOE (DYSPNEA ON EXERTION): ICD-10-CM

## 2023-10-03 PROCEDURE — 93000 ELECTROCARDIOGRAM COMPLETE: CPT | Performed by: INTERNAL MEDICINE

## 2023-10-03 PROCEDURE — 3077F SYST BP >= 140 MM HG: CPT | Performed by: INTERNAL MEDICINE

## 2023-10-03 PROCEDURE — 3079F DIAST BP 80-89 MM HG: CPT | Performed by: INTERNAL MEDICINE

## 2023-10-03 PROCEDURE — 99214 OFFICE O/P EST MOD 30 MIN: CPT | Performed by: INTERNAL MEDICINE

## 2023-10-11 ENCOUNTER — TELEPHONE (OUTPATIENT)
Age: 63
End: 2023-10-11

## 2023-10-11 NOTE — TELEPHONE ENCOUNTER
----- Message from Magdaleno Allan MD sent at 10/11/2023  1:29 PM EDT -----  Please let the patient know that the patient was predominantly in sinus rhythm. The patient had rare ectopy and asymptomatic, nonsustained SVT. No new changes to medical therapy at this time.

## 2024-08-30 ENCOUNTER — TELEPHONE (OUTPATIENT)
Age: 64
End: 2024-08-30